# Patient Record
Sex: MALE | Race: WHITE | Employment: FULL TIME | URBAN - METROPOLITAN AREA
[De-identification: names, ages, dates, MRNs, and addresses within clinical notes are randomized per-mention and may not be internally consistent; named-entity substitution may affect disease eponyms.]

---

## 2017-02-08 ENCOUNTER — APPOINTMENT (OUTPATIENT)
Dept: OCCUPATIONAL MEDICINE | Age: 52
End: 2017-02-08
Attending: EMERGENCY MEDICINE

## 2017-06-27 ENCOUNTER — HOSPITAL ENCOUNTER (OUTPATIENT)
Dept: ELECTROPHYSIOLOGY | Facility: HOSPITAL | Age: 52
Discharge: HOME | End: 2017-06-27
Attending: INTERNAL MEDICINE
Payer: COMMERCIAL

## 2017-06-27 DIAGNOSIS — R20.2 PARESTHESIA: ICD-10-CM

## 2017-06-27 DIAGNOSIS — M54.10 RADICULITIS: ICD-10-CM

## 2017-06-27 PROCEDURE — 95909 NRV CNDJ TST 5-6 STUDIES: CPT

## 2017-06-27 PROCEDURE — 95885 MUSC TST DONE W/NERV TST LIM: CPT

## 2019-05-09 ENCOUNTER — ORDER TRANSCRIPTION (OUTPATIENT)
Dept: SLEEP CENTER | Facility: HOSPITAL | Age: 54
End: 2019-05-09

## 2019-05-09 DIAGNOSIS — R06.81 APNEA: Primary | ICD-10-CM

## 2019-05-22 ENCOUNTER — OFFICE VISIT (OUTPATIENT)
Dept: SLEEP CENTER | Facility: HOSPITAL | Age: 54
End: 2019-05-22
Attending: PHYSICIAN ASSISTANT
Payer: COMMERCIAL

## 2019-05-22 DIAGNOSIS — R06.81 APNEA: ICD-10-CM

## 2019-05-22 PROCEDURE — 95806 SLEEP STUDY UNATT&RESP EFFT: CPT

## 2019-05-23 ENCOUNTER — OFFICE VISIT (OUTPATIENT)
Dept: SLEEP CENTER | Facility: HOSPITAL | Age: 54
End: 2019-05-23
Attending: INTERNAL MEDICINE
Payer: COMMERCIAL

## 2019-05-23 PROCEDURE — 95806 SLEEP STUDY UNATT&RESP EFFT: CPT

## 2019-05-30 NOTE — PROCEDURES
[address]       Accredited by the [facility] ([facility])    PATIENT'S NAME:        Elsa Sharp  ATTENDING PHYSICIAN:   Janna Allen M.D.   REFERRING PHYSICIAN:     PATIENT ACCOUNT #:     [id number] desaturations. RECOMMENDATIONS:  A trial of nasal CPAP is recommended. The patient should return for an overnight polysomnography for CPAP titration.   Our office will contact the patient with regard to the results of this study and arrange for appropri

## 2024-05-10 ENCOUNTER — OFFICE VISIT (OUTPATIENT)
Dept: PULMONOLOGY | Facility: CLINIC | Age: 59
End: 2024-05-10

## 2024-05-10 VITALS
OXYGEN SATURATION: 99 % | WEIGHT: 198 LBS | HEIGHT: 73.98 IN | HEART RATE: 58 BPM | SYSTOLIC BLOOD PRESSURE: 133 MMHG | BODY MASS INDEX: 25.41 KG/M2 | DIASTOLIC BLOOD PRESSURE: 79 MMHG

## 2024-05-10 DIAGNOSIS — R06.00 DYSPNEA, UNSPECIFIED TYPE: Primary | ICD-10-CM

## 2024-05-10 PROCEDURE — 99204 OFFICE O/P NEW MOD 45 MIN: CPT | Performed by: INTERNAL MEDICINE

## 2024-05-10 RX ORDER — MELOXICAM 15 MG/1
1 TABLET ORAL DAILY
COMMUNITY

## 2024-05-10 RX ORDER — LOSARTAN POTASSIUM AND HYDROCHLOROTHIAZIDE 12.5; 1 MG/1; MG/1
1 TABLET ORAL EVERY MORNING
COMMUNITY
Start: 2023-03-08

## 2024-05-10 RX ORDER — ROSUVASTATIN CALCIUM 10 MG/1
1 TABLET, COATED ORAL EVERY EVENING
COMMUNITY

## 2024-05-10 RX ORDER — GLIMEPIRIDE 2 MG/1
1 TABLET ORAL DAILY
COMMUNITY
Start: 2023-03-01

## 2024-05-10 RX ORDER — FLUTICASONE FUROATE, UMECLIDINIUM BROMIDE AND VILANTEROL TRIFENATATE 100; 62.5; 25 UG/1; UG/1; UG/1
1 POWDER RESPIRATORY (INHALATION) DAILY
COMMUNITY
Start: 2024-02-21

## 2024-05-10 NOTE — H&P
Referring Physician  SABRINA BRENNER, MD FI[facility]    Chief Complaint  Sleep apnea, dyspnea    History of Present Illness  Patient is a 58-year-old male who presents to pulmonary clinic for initial visit.  Previously seen by prior pulmonologist for management of sleep apnea.  Using CPAP device on nightly basis which she tolerates well.  Admits to improvement with hypersomnia and fatigue while using device.  Admits to chronic dyspnea with exertion.  Admits to prior 30-pack-year history of tobacco abuse quit 14 years ago.  Started on Trelegy 1 month prior.  Denies significant cough or wheezing denies hospitalization secondary dyspnea    Review of Systems  Constitutional: denies weight loss, fevers, chills, weakness, fatigue, recent illness  HEENT: denies epistaxis, sore throat, postnasal drip  Cardio: denies chest pain, chest pressure, palpitations  Respiratory: Dyspnea with exertion, denies cough, wheezing, hemoptysis   GI: denies nausea, vomiting, abdominal pain  : denies dysuria, hematuria  Musculoskeletal: denies arthralgia, myalgia  Integumentary: denies rash, itching  Neurological: denies syncope, weakness, dizziness,   Psychiatric: denies depression, anxiety  Hematologic: denies bruising    Past Medical History  Past Medical History:    FAUSTO (obstructive sleep apnea)    AHI 21 [address] Gust[place] 82%         Surgical History  Past Surgical History:   Procedure Laterality Date    Tonsillectomy         Family History  Family History   Problem Relation Age of Onset    Heart Attack Father     Dementia Mother     Other (Other) Sister         COPD    Other (Other) Brother         COPD        Social History  Tobacco: 30-pack-year history of tobacco abuse.  Quit 14 years ago  Alcohol: Denies significant intake  Illicit Drugs: Denies    Medications  Current Outpatient Medications on File Prior to Visit   Medication Sig Dispense Refill    fluticasone-umeclidin-vilant (TRELEGY ELLIPTA) 100-62.5-25 MCG/ACT Inhalation Aerosol Powder,  Breath Activated Inhale 1 puff into the lungs daily.      glimepiride 2 MG Oral Tab Take 1 tablet (2 mg total) by mouth daily.      Losartan Potassium-HCTZ 100-12.5 MG Oral Tab Take 1 tablet by mouth every morning.      Meloxicam 15 MG Oral Tab Take 1 tablet (15 mg total) by mouth daily.      metFORMIN 500 MG Oral Tab Take 1 tablet (500 mg total) by mouth 2 (two) times daily with meals.      rosuvastatin 10 MG Oral Tab Take 1 tablet (10 mg total) by mouth every evening.      PROAIR RESPICLICK 108 (90 Base) MCG/ACT Inhalation Aerosol Powder, Breath Activated INL 2 PFS PO  INTO LUNGS Q 4 H  0     No current facility-administered medications on file prior to visit.       Allergies  Allergies   Allergen Reactions    Codeine [Opioid Analgesics] UNKNOWN     ADVERSE REACTION TO ALL CODEINE RELATED PRODUCTS / NARCOTICS       Physical Exam  Constitutional: no acute distress  HEENT: PERRL  Neck: supple, no JVD  Cardio: RRR, S1 S2  Respiratory: clear to auscultation bilaterally, no wheezing, rales, rhonchi, crackles  GI: abdomen soft, non tender, active bowel sounds, no organomegaly  Extremities: no clubbing, cyanosis, edema  Neurologic: no gross motor deficits  Skin: warm, dry  Lymphatic: no supraclavicular lymphadenopathy     Assessment  1.  Moderate FAUSTO  2.  Prior nicotine dependence  3.  Dyspnea with exertion    Plan  -Patient presents today establish care with new pulmonologist.  Admits to underlying history dyspnea with exertion recently started on Trelegy maintenance inhaler therapy by primary care physician.  I will obtain baseline pulmonary function testing.  Advised patient to continue maintenance inhaler therapy for now with albuterol MDI.  -I reviewed download data over last 30 days with usage 87% days and average usage 7 hours and 43 minutes.  AHI is 2.7.  Advised patient to continue using CPAP device since he is benefiting from it  -Encouraged ongoing tobacco cessation    Jeanne Brown, DO  Pulmonary Critical  [facility]  5/10/2024  11:05 AM

## 2025-03-18 ENCOUNTER — EXTERNAL LAB (OUTPATIENT)
Dept: HEALTH INFORMATION MANAGEMENT | Facility: OTHER | Age: 60
End: 2025-03-18

## 2025-03-18 LAB
25(OH)D3+25(OH)D2 SERPL-MCNC: 16.78 NG/ML (ref 30–100)
ALBUMIN SERPL-MCNC: 4.1 G/DL (ref 3.5–6.7)
ALP SERPL-CCNC: 92.7 U/L (ref 34–104)
ALT SERPL-CCNC: 33.9 SEE REPORT FOR UNITS (ref 7–65)
APPEARANCE UR: CLEAR
AST SERPL-CCNC: 17 U/L (ref 13–39)
BACTERIA #/AREA URNS HPF: NORMAL /[HPF]
BASOPHILS # BLD: 0.08 10E3/UL (ref 0.01–0.08)
BASOPHILS NFR BLD: 0.6 % (ref 0.2–1.2)
BILIRUB SERPL-MCNC: 0.5 MG/DL (ref 0.2–1)
BILIRUB UR QL: NORMAL
BUN SERPL-MCNC: 15.3 SEE REPORT FOR UNITS (ref 8–24)
BUN/CREAT SERPL: 19 RATIO (ref 6–25)
CALCIUM SERPL-MCNC: 9.4 MG/DL (ref 8.7–10.4)
CHLORIDE SERPL-SCNC: 102.2 MEQ/L (ref 98–107)
CO2 SERPL-SCNC: 30.4 MEQ/L (ref 21–31)
COLOR UR: NORMAL
CREAT SERPL-MCNC: 0.8 MG/DL (ref 0.7–1.3)
EOSINOPHIL # BLD: 0.33 10E3/UL (ref 0.04–0.54)
EOSINOPHIL NFR BLD: 2.6 % (ref 0.6–7)
ERYTHROCYTE [DISTWIDTH] IN BLOOD: 13.2 % (ref 11–16)
FOLATE SERPL-MCNC: 6 NG/ML (ref 6–25)
GFR SERPLBLD SCHWARTZ-ARVRAT: 101 SEE REPORT FOR UNITS
GLOBULIN SER-MCNC: 2.9 GM/DL (ref 1–3.5)
GLUCOSE SERPL-MCNC: 141.8 MG/DL (ref 70–108)
GLUCOSE SERPL-MCNC: 150 MG/DL
GLUCOSE UR-MCNC: NORMAL MG/DL
HBA1C MFR BLD: 7.2 % (ref 4–5.8)
HCT VFR BLD CALC: 51 % (ref 34.2–51.3)
HGB BLD-MCNC: 15.8 G/DL
HGB BLD-MCNC: 16.1 G/DL (ref 13.7–17.5)
HGB UR QL: NORMAL
KETONES UR-MCNC: NORMAL MG/DL
LENGTH OF FAST TIME PATIENT: ABNORMAL H
LENGTH OF FAST TIME PATIENT: NORMAL H
LEUKOCYTE ESTERASE UR QL STRIP: NEGATIVE
LYMPHOCYTES # BLD: 2.41 10E3/UL (ref 1.32–3.57)
LYMPHOCYTES NFR BLD: 18.9 % (ref 21.8–53.1)
MCH RBC QN AUTO: 32.3 PG (ref 25.7–32.2)
MCHC RBC AUTO-ENTMCNC: 31.6 G/DL (ref 29–34.3)
MCV RBC AUTO: 102.4 FL (ref 76.2–99.6)
MONOCYTES # BLD: 1.16 10E3/UL (ref 0.3–0.82)
MONOCYTES NFR BLD: 9 % (ref 5.3–12.2)
MUCOUS THREADS URNS QL MICRO: PRESENT
NEUTROPHILS # BLD: 8.7 10E3/UL (ref 1.78–5.38)
NEUTROPHILS NFR BLD: 68.2 % (ref 34–67.9)
NITRITE UR QL: NORMAL
PH UR: 6.6 [PH] (ref 5–7.5)
PLATELET # BLD: 245 10E3/UL
POTASSIUM SERPL-SCNC: 4.4 MMOL/L (ref 3.6–6)
PROT SERPL-MCNC: 7 G/DL (ref 5.6–8.3)
PROT UR QL: NORMAL
PSA SERPL-MCNC: 1.51 NG/ML (ref 0.01–4)
RBC # BLD: 4.98 10E6/UL (ref 4.63–6.05)
RBC #/AREA URNS HPF: NORMAL /[HPF] (ref 0–2)
SODIUM SERPL-SCNC: 140.2 MMOL/L (ref 135–145)
SP GR UR: 1.02 (ref 1–1.03)
SQUAMOUS #/AREA URNS HPF: NORMAL /[HPF]
TSH SERPL-ACNC: 1.94 M[IU]/L (ref 0.34–5.6)
UROBILINOGEN UR QL: NORMAL
VIT B12 SERPL-MCNC: 270 PG/ML (ref 180–914)
WBC # BLD: 12.62 10E3/UL (ref 4.23–9.07)
WBC #/AREA URNS HPF: 0.5 /[HPF] (ref 0–5)
WBC CLUMPS: NORMAL

## 2025-04-18 ENCOUNTER — TELEPHONE (OUTPATIENT)
Dept: UROLOGY | Age: 60
End: 2025-04-18

## 2025-04-18 ENCOUNTER — EXTERNAL LAB (OUTPATIENT)
Dept: HEALTH INFORMATION MANAGEMENT | Facility: OTHER | Age: 60
End: 2025-04-18

## 2025-04-18 LAB
APPEARANCE UR: CLEAR
BACTERIA #/AREA URNS HPF: ABNORMAL /HPF
BILIRUB UR QL: ABNORMAL
COLOR UR: ABNORMAL
GLUCOSE UR-MCNC: ABNORMAL MG/DL
HGB UR QL: ABNORMAL
KETONES UR-MCNC: ABNORMAL MG/DL
LEUKOCYTE ESTERASE UR QL STRIP: ABNORMAL
NITRITE UR QL: NEGATIVE
PH UR: 5 [PH] (ref 5–8)
PROT UR QL: ABNORMAL MG/DL
RBC #/AREA URNS HPF: ABNORMAL /HPF (ref 0–2)
SP GR UR: 1.01 (ref 1–1.03)
SQUAMOUS #/AREA URNS HPF: ABNORMAL /LPF
UROBILINOGEN UR QL: ABNORMAL MG/DL
WBC #/AREA URNS HPF: ABNORMAL /HPF (ref 0–5)

## 2025-04-22 RX ORDER — TAMSULOSIN HYDROCHLORIDE 0.4 MG/1
0.4 CAPSULE ORAL DAILY
Qty: 30 CAPSULE | Refills: 0 | Status: SHIPPED | OUTPATIENT
Start: 2025-04-22

## 2025-04-29 ENCOUNTER — APPOINTMENT (OUTPATIENT)
Dept: UROLOGY | Age: 60
End: 2025-04-29

## 2025-04-29 DIAGNOSIS — R33.9 URINE RETENTION: Primary | ICD-10-CM

## 2025-04-29 PROCEDURE — 99211 OFF/OP EST MAY X REQ PHY/QHP: CPT | Performed by: UROLOGY

## 2025-05-16 ENCOUNTER — TELEPHONE (OUTPATIENT)
Dept: UROLOGY | Age: 60
End: 2025-05-16

## 2025-05-16 ENCOUNTER — APPOINTMENT (OUTPATIENT)
Dept: UROLOGY | Age: 60
End: 2025-05-16

## 2025-05-16 VITALS — HEIGHT: 73 IN | BODY MASS INDEX: 24.52 KG/M2 | WEIGHT: 185 LBS

## 2025-05-16 DIAGNOSIS — Z12.5 PROSTATE CANCER SCREENING: ICD-10-CM

## 2025-05-16 DIAGNOSIS — R33.9 URINE RETENTION: Primary | ICD-10-CM

## 2025-05-16 DIAGNOSIS — N40.0 BENIGN PROSTATIC HYPERPLASIA WITHOUT LOWER URINARY TRACT SYMPTOMS: ICD-10-CM

## 2025-05-16 LAB — BLDR SCAN MLS: NORMAL

## 2025-05-16 RX ORDER — GLIMEPIRIDE 2 MG/1
TABLET ORAL
COMMUNITY
Start: 2025-03-20

## 2025-05-16 RX ORDER — LOSARTAN POTASSIUM AND HYDROCHLOROTHIAZIDE 12.5; 1 MG/1; MG/1
1 TABLET ORAL EVERY MORNING
COMMUNITY

## 2025-05-16 RX ORDER — CHOLECALCIFEROL (VITAMIN D3) 125 MCG
CAPSULE ORAL
COMMUNITY
Start: 2025-04-07

## 2025-05-16 RX ORDER — FAMOTIDINE 40 MG/1
TABLET, FILM COATED ORAL
COMMUNITY

## 2025-05-16 RX ORDER — MELOXICAM 15 MG/1
15 TABLET ORAL
COMMUNITY

## 2025-05-16 RX ORDER — TAMSULOSIN HYDROCHLORIDE 0.4 MG/1
0.4 CAPSULE ORAL
Qty: 90 CAPSULE | Refills: 3 | Status: SHIPPED | OUTPATIENT
Start: 2025-05-16 | End: 2026-05-16

## 2025-05-16 RX ORDER — LANOLIN ALCOHOL/MO/W.PET/CERES
1000 CREAM (GRAM) TOPICAL EVERY MORNING
COMMUNITY
Start: 2025-04-03

## 2025-05-16 RX ORDER — PREDNISONE 20 MG/1
TABLET ORAL
COMMUNITY
Start: 2025-03-10

## 2025-11-14 ENCOUNTER — APPOINTMENT (OUTPATIENT)
Dept: UROLOGY | Age: 60
End: 2025-11-14